# Patient Record
Sex: MALE | Race: WHITE | NOT HISPANIC OR LATINO | URBAN - METROPOLITAN AREA
[De-identification: names, ages, dates, MRNs, and addresses within clinical notes are randomized per-mention and may not be internally consistent; named-entity substitution may affect disease eponyms.]

---

## 2023-07-06 ENCOUNTER — LAB OUTSIDE AN ENCOUNTER (OUTPATIENT)
Dept: URBAN - METROPOLITAN AREA CLINIC 19 | Facility: CLINIC | Age: 68
End: 2023-07-06

## 2023-07-06 ENCOUNTER — OUT OF OFFICE VISIT (OUTPATIENT)
Dept: URBAN - METROPOLITAN AREA MEDICAL CENTER 25 | Facility: MEDICAL CENTER | Age: 68
End: 2023-07-06

## 2023-07-06 ENCOUNTER — CLAIMS CREATED FROM THE CLAIM WINDOW (OUTPATIENT)
Dept: URBAN - METROPOLITAN AREA MEDICAL CENTER 25 | Facility: MEDICAL CENTER | Age: 68
End: 2023-07-06
Payer: MEDICARE

## 2023-07-06 DIAGNOSIS — D64.89 ANEMIA DUE TO OTHER CAUSE: ICD-10-CM

## 2023-07-06 DIAGNOSIS — K62.5 ANAL BLEEDING: ICD-10-CM

## 2023-07-06 LAB
FERRITIN LEVEL: 68.9
FOLATE LEVEL: 8.6
LACTIC ACID: 1.6
PERFORMING LAB: (no result)
VITAMIN B12 LEVEL: 146

## 2023-07-06 PROCEDURE — G8427 DOCREV CUR MEDS BY ELIG CLIN: HCPCS | Performed by: INTERNAL MEDICINE

## 2023-07-06 PROCEDURE — 99222 1ST HOSP IP/OBS MODERATE 55: CPT | Performed by: INTERNAL MEDICINE

## 2023-07-07 ENCOUNTER — CLAIMS CREATED FROM THE CLAIM WINDOW (OUTPATIENT)
Dept: URBAN - METROPOLITAN AREA MEDICAL CENTER 25 | Facility: MEDICAL CENTER | Age: 68
End: 2023-07-07

## 2023-07-07 ENCOUNTER — CLAIMS CREATED FROM THE CLAIM WINDOW (OUTPATIENT)
Dept: URBAN - METROPOLITAN AREA MEDICAL CENTER 25 | Facility: MEDICAL CENTER | Age: 68
End: 2023-07-07
Payer: MEDICARE

## 2023-07-07 DIAGNOSIS — K62.5 ANAL BLEEDING: ICD-10-CM

## 2023-07-07 DIAGNOSIS — D62 ABLA (ACUTE BLOOD LOSS ANEMIA): ICD-10-CM

## 2023-07-07 DIAGNOSIS — K55.20 ACQUIRED ARTERIOVENOUS MALFORMATION OF COLON: ICD-10-CM

## 2023-07-07 PROCEDURE — 91110 GI TRC IMG INTRAL ESOPH-ILE: CPT | Performed by: INTERNAL MEDICINE

## 2023-07-07 PROCEDURE — 43235 EGD DIAGNOSTIC BRUSH WASH: CPT | Performed by: INTERNAL MEDICINE

## 2023-07-07 PROCEDURE — 45388 COLONOSCOPY W/ABLATION: CPT | Performed by: INTERNAL MEDICINE

## 2023-07-07 PROCEDURE — 99232 SBSQ HOSP IP/OBS MODERATE 35: CPT | Performed by: INTERNAL MEDICINE

## 2023-07-31 ENCOUNTER — OFFICE VISIT (OUTPATIENT)
Dept: URBAN - METROPOLITAN AREA CLINIC 19 | Facility: CLINIC | Age: 68
End: 2023-07-31
Payer: MEDICARE

## 2023-07-31 ENCOUNTER — WEB ENCOUNTER (OUTPATIENT)
Dept: URBAN - METROPOLITAN AREA CLINIC 19 | Facility: CLINIC | Age: 68
End: 2023-07-31

## 2023-07-31 VITALS
BODY MASS INDEX: 26.54 KG/M2 | WEIGHT: 185.4 LBS | TEMPERATURE: 98.1 F | HEIGHT: 70 IN | HEART RATE: 63 BPM | OXYGEN SATURATION: 99 % | DIASTOLIC BLOOD PRESSURE: 80 MMHG | SYSTOLIC BLOOD PRESSURE: 132 MMHG

## 2023-07-31 DIAGNOSIS — Z80.0 FAMILY HISTORY OF COLON CANCER IN MOTHER: ICD-10-CM

## 2023-07-31 DIAGNOSIS — K57.31 DIVERTICULAR HEMORRHAGE: ICD-10-CM

## 2023-07-31 PROBLEM — 197092000: Status: ACTIVE | Noted: 2023-07-31

## 2023-07-31 PROCEDURE — 99214 OFFICE O/P EST MOD 30 MIN: CPT | Performed by: NURSE PRACTITIONER

## 2023-07-31 RX ORDER — LISINOPRIL 20 MG/1
TAKE 1 TABLET (20 MG) BY ORAL ROUTE ONCE DAILY TABLET ORAL 1
Qty: 0 | Refills: 0 | Status: ACTIVE | COMMUNITY
Start: 1900-01-01

## 2023-07-31 RX ORDER — VERAPAMIL HYDROCHLORIDE 80 MG/1
TAKE 1 TABLET (80 MG) BY ORAL ROUTE 3 TIMES PER DAY TABLET ORAL
Qty: 0 | Refills: 0 | Status: ACTIVE | COMMUNITY
Start: 1900-01-01

## 2023-07-31 NOTE — HPI-TODAY'S VISIT:
Mr. 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 difficile and mucositis who presents today for hospital follow-up. Admitted to [facility] 7/6/2023 - 7/9/2023 with rectal bleeding.   CT A/P with small hiatal hernia and diverticulosis without diverticulitis and urinary bladder wall thickening and possible prostate hypertrophy.  Hemoglobin dropped from 12-10 at outside hospital and then dropped to 10.8 on this admission.  He was transfused 1 unit PRBCs.  EGD/colonoscopy was done by Dr. Dangelo on 7/7/2023. EGD normal Colonoscopy demonstrated a single angiodysplastic lesion in the ascending colon, coagulation for hemostasis using APC with successful.  Multiple diverticuli in the sigmoid and descending colon and internal hemorrhoids. PillCam with tiny 1 mm red spots and tiny red streaks thought to be possibly from mucosal irritation from EGD performed.  No small bowel pathology such as AVMs or small bowel ulcerations seen.  Bleed most likely due to diverticular bleed.   Today, he reports no further rectal bleed. Having regular BMs, takes a stool softener daily. No straining. Still having some weakness but improved. No abdominal pain. Denies black stools.  Family Hx: Mother has colon cancer.

## 2023-08-01 ENCOUNTER — TELEPHONE ENCOUNTER (OUTPATIENT)
Dept: URBAN - METROPOLITAN AREA CLINIC 19 | Facility: CLINIC | Age: 68
End: 2023-08-01

## 2023-08-01 PROBLEM — 87522002: Status: ACTIVE | Noted: 2023-08-01

## 2023-08-01 LAB
FERRITIN, SERUM: 8
HEMATOCRIT: 30.3
HEMOGLOBIN: 9.4
IRON BIND.CAP.(TIBC): 372
IRON SATURATION: 5
IRON: 19
MCH: 29.4
MCHC: 31
MCV: 94.7
MPV: 8.9
PLATELET COUNT: 281
RDW: 13.6
RED BLOOD CELL COUNT: 3.2
WHITE BLOOD CELL COUNT: 4

## 2023-08-01 RX ORDER — FERROUS SULFATE 325(65) MG
1 TABLET TABLET ORAL ONCE A DAY
Qty: 90 TABLET | Refills: 1 | OUTPATIENT
Start: 2023-08-01

## 2023-08-01 RX ORDER — VERAPAMIL HYDROCHLORIDE 80 MG/1
TAKE 1 TABLET (80 MG) BY ORAL ROUTE 3 TIMES PER DAY TABLET ORAL
Qty: 0 | Refills: 0 | COMMUNITY
Start: 1900-01-01

## 2023-08-01 RX ORDER — LISINOPRIL 20 MG/1
TAKE 1 TABLET (20 MG) BY ORAL ROUTE ONCE DAILY TABLET ORAL 1
Qty: 0 | Refills: 0 | COMMUNITY
Start: 1900-01-01

## 2023-11-09 ENCOUNTER — OFFICE VISIT (OUTPATIENT)
Dept: URBAN - METROPOLITAN AREA CLINIC 19 | Facility: CLINIC | Age: 68
End: 2023-11-09

## 2023-12-27 ENCOUNTER — DASHBOARD ENCOUNTERS (OUTPATIENT)
Age: 68
End: 2023-12-27

## 2023-12-29 ENCOUNTER — CLAIMS CREATED FROM THE CLAIM WINDOW (OUTPATIENT)
Dept: RURAL CLINIC 2 | Facility: CLINIC | Age: 68
End: 2023-12-29
Payer: MEDICARE

## 2023-12-29 ENCOUNTER — OFFICE VISIT (OUTPATIENT)
Dept: RURAL CLINIC 2 | Facility: CLINIC | Age: 68
End: 2023-12-29
Payer: MEDICARE

## 2023-12-29 VITALS
SYSTOLIC BLOOD PRESSURE: 133 MMHG | BODY MASS INDEX: 26.48 KG/M2 | HEIGHT: 70 IN | WEIGHT: 185 LBS | TEMPERATURE: 98 F | DIASTOLIC BLOOD PRESSURE: 79 MMHG | HEART RATE: 67 BPM

## 2023-12-29 DIAGNOSIS — K62.5 RECTAL BLEEDING: ICD-10-CM

## 2023-12-29 DIAGNOSIS — Z87.19 HISTORY OF GASTROINTESTINAL DIVERTICULAR HEMORRHAGE: ICD-10-CM

## 2023-12-29 PROCEDURE — 99213 OFFICE O/P EST LOW 20 MIN: CPT | Performed by: NURSE PRACTITIONER

## 2023-12-29 RX ORDER — VERAPAMIL HYDROCHLORIDE 80 MG/1
TAKE 1 TABLET (80 MG) BY ORAL ROUTE 3 TIMES PER DAY TABLET ORAL
Qty: 0 | Refills: 0 | Status: ACTIVE | COMMUNITY
Start: 1900-01-01

## 2023-12-29 RX ORDER — PANTOPRAZOLE SODIUM 40 MG/1
1 TABLET TABLET, DELAYED RELEASE ORAL ONCE A DAY
Status: ACTIVE | COMMUNITY

## 2023-12-29 RX ORDER — FERROUS SULFATE 325(65) MG
1 TABLET TABLET ORAL ONCE A DAY
Qty: 90 TABLET | Refills: 1 | Status: DISCONTINUED | COMMUNITY
Start: 2023-08-01

## 2023-12-29 RX ORDER — LORAZEPAM 2 MG/1
1 TABLET AT BEDTIME AS NEEDED TABLET ORAL ONCE A DAY
Status: ACTIVE | COMMUNITY

## 2023-12-29 RX ORDER — LISINOPRIL 20 MG/1
TAKE 1 TABLET (20 MG) BY ORAL ROUTE ONCE DAILY TABLET ORAL 1
Qty: 0 | Refills: 0 | Status: ACTIVE | COMMUNITY
Start: 1900-01-01

## 2023-12-29 NOTE — HPI-ZZZTODAY'S VISIT
The patient is a 68-year-old gentleman who presents on referral from Dr. Michi Pena for  recurrent lower GI bleed.  A copy of this document to be sent to the referring provider.  The patient was hospitalized in July of this year for GI hemorrhage with suspected diverticular bleed.  A single AVM in the colon was found and cauterized.  4 weeks ago he reports recurrent bright red blood per rectum, not as significant as prior however he was seen at [facility] ER and by the time he arrived his bleeding had resolved.  Blood work revealed stable hemoglobin and he repeated levels last week with a hemoglobin of 13.7.  He denies any further GI bleeding over the past 4 weeks, he is having regular bowel movements and continues on a daily stool softener.

## 2023-12-29 NOTE — HPI-TODAY'S VISIT:
Mr. 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 difficile and mucositis who presents today for hospital follow-up. Admitted to [facility] 7/6/2023 - 7/9/2023 with rectal bleeding.   CT A/P with small hiatal hernia and diverticulosis without diverticulitis and urinary bladder wall thickening and possible prostate hypertrophy.  Hemoglobin dropped from 12-10 at outside hospital and then dropped to 10.8 on this admission.  He was transfused 1 unit PRBCs.  EGD/colonoscopy was done by Dr. Dangelo on 7/7/2023. EGD normal Colonoscopy demonstrated a single angiodysplastic lesion in the ascending colon, coagulation for hemostasis using APC with successful.  Multiple diverticuli in the sigmoid and descending colon and internal hemorrhoids. PillCam with tiny 1 mm red spots and tiny red streaks thought to be possibly from mucosal irritation from EGD performed.  No small bowel pathology such as AVMs or small bowel ulcerations seen.  Bleed most likely due to diverticular bleed.   Today, he reports no further rectal bleed. Having regular BMs, takes a stool softener daily. No straining. Still having some weakness but improved. No abdominal pain. Denies black stools.  Family Hx: Mother has colon cancer.

## 2023-12-31 PROBLEM — 12063002: Status: ACTIVE | Noted: 2023-12-31

## 2023-12-31 PROBLEM — 275551007: Status: ACTIVE | Noted: 2023-12-31
